# Patient Record
Sex: FEMALE | HISPANIC OR LATINO | Employment: STUDENT | URBAN - METROPOLITAN AREA
[De-identification: names, ages, dates, MRNs, and addresses within clinical notes are randomized per-mention and may not be internally consistent; named-entity substitution may affect disease eponyms.]

---
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
## 2025-02-06 ENCOUNTER — OFFICE VISIT (OUTPATIENT)
Dept: FAMILY MEDICINE | Facility: CLINIC | Age: 20
End: 2025-02-06
Payer: COMMERCIAL

## 2025-02-06 VITALS
DIASTOLIC BLOOD PRESSURE: 81 MMHG | BODY MASS INDEX: 30.49 KG/M2 | OXYGEN SATURATION: 98 % | WEIGHT: 183 LBS | TEMPERATURE: 98.5 F | RESPIRATION RATE: 18 BRPM | SYSTOLIC BLOOD PRESSURE: 124 MMHG | HEIGHT: 65 IN | HEART RATE: 95 BPM

## 2025-02-06 DIAGNOSIS — Z11.3 SCREENING FOR STDS (SEXUALLY TRANSMITTED DISEASES): ICD-10-CM

## 2025-02-06 DIAGNOSIS — N39.0 FREQUENT UTI: Primary | ICD-10-CM

## 2025-02-06 LAB
ALBUMIN UR-MCNC: NEGATIVE MG/DL
APPEARANCE UR: ABNORMAL
BACTERIA #/AREA URNS HPF: ABNORMAL /HPF
BILIRUB UR QL STRIP: NEGATIVE
CLUE CELLS: ABNORMAL
COLOR UR AUTO: YELLOW
GLUCOSE UR STRIP-MCNC: NEGATIVE MG/DL
HGB UR QL STRIP: NEGATIVE
KETONES UR STRIP-MCNC: NEGATIVE MG/DL
LEUKOCYTE ESTERASE UR QL STRIP: NEGATIVE
NITRATE UR QL: NEGATIVE
PH UR STRIP: 6 [PH] (ref 5–7)
RBC #/AREA URNS AUTO: ABNORMAL /HPF
SP GR UR STRIP: 1.02 (ref 1–1.03)
SQUAMOUS #/AREA URNS AUTO: ABNORMAL /LPF
TRICHOMONAS, WET PREP: ABNORMAL
UROBILINOGEN UR STRIP-ACNC: 0.2 E.U./DL
WBC #/AREA URNS AUTO: ABNORMAL /HPF
WBC'S/HIGH POWER FIELD, WET PREP: ABNORMAL
YEAST, WET PREP: ABNORMAL

## 2025-02-06 ASSESSMENT — PAIN SCALES - GENERAL: PAINLEVEL_OUTOF10: MODERATE PAIN (4)

## 2025-02-06 NOTE — PROGRESS NOTES
"  Assessment & Plan     Frequent UTI - no UTI based on criteria today. She is still experiencing urinary frequency, odor, and dysuria however. Negative wet prep. Will await STI results. She reports she is her partner's first sexual partner however. Advised she hydrate well, urinate often, and start a urinary probiotic.   - UA with Microscopic reflex to Culture - Clinic Collect  - UA Microscopic with Reflex to Culture  - UA Macroscopic with reflex to Microscopic and Culture - Lab Collect; Future  - Wet prep - lab collect; Future  - Wet prep - lab collect    Screening for STDs (sexually transmitted diseases)  - Chlamydia trachomatis/Neisseria gonorrhoeae by PCR- VAGINAL SELF-SWAB          BMI  Estimated body mass index is 30.22 kg/m  as calculated from the following:    Height as of this encounter: 1.657 m (5' 5.25\").    Weight as of this encounter: 83 kg (183 lb).       Niles Hauser is a 19 year old, presenting for the following health issues:  UTI (Constant UTI's, keep reoccurring once she get off of the prescribed antibiotics and pain worsens with each infection. Urgent Care told her to schedule an appointment with the frequency it happens.)        2/6/2025     3:08 PM   Additional Questions   Accompanied by Astrid Bentley     UTI    History of Present Illness       Reason for visit:  Constant urgent care visits for reoccurring uti and pain getting worse  Symptom onset:  More than a month  Symptoms include:  Odor of urine, discoloration, pain  Symptom intensity:  Moderate  Symptom progression:  Worsening  Had these symptoms before:  No  What makes it worse:  No  What makes it better:  No She is missing 1 dose(s) of medications per week.  She is not taking prescribed medications regularly due to remembering to take.       ED/UC Followup:    Facility:  [facility]  Date of visit: 01/13/2025  Reason for visit: UTI  Current Status: Same - Not Improving    Genitourinary - Female  Onset/Duration: Off and On Since " "02/2024  Description:   Painful urination (Dysuria): YES           Frequency: YES  Blood in urine (Hematuria): No  Delay in urine (Hesitency): YES  Intensity: moderate  Progression of Symptoms:  worsening, same, and more frequent  Accompanying Signs & Symptoms:  Fever/chills: No  Flank pain: YES- slight  Nausea and vomiting: YES  Vaginal symptoms: odor  Abdominal/Pelvic Pain: YES  History:   History of frequent UTI s: YES  History of kidney stones: No  Sexually Active: YES  Possibility of pregnancy: No  Precipitating or alleviating factors: None  Therapies tried and outcome: Increase fluid intake        Review of Systems  Constitutional, HEENT, cardiovascular, pulmonary, gi and gu systems are negative, except as otherwise noted.      Objective    /81 (BP Location: Right arm, Patient Position: Sitting, Cuff Size: Adult Large)   Pulse 95   Temp 98.5  F (36.9  C) (Oral)   Resp 18   Ht 1.657 m (5' 5.25\")   Wt 83 kg (183 lb)   LMP 01/23/2025 (Exact Date)   SpO2 98%   BMI 30.22 kg/m    Body mass index is 30.22 kg/m .  Physical Exam   GENERAL: alert and no distress    Results for orders placed or performed in visit on 02/06/25   UA with Microscopic reflex to Culture - Clinic Collect     Status: Abnormal    Specimen: Urine, Clean Catch   Result Value Ref Range    Color Urine Yellow Colorless, Straw, Light Yellow, Yellow    Appearance Urine Cloudy (A) Clear    Glucose Urine Negative Negative mg/dL    Bilirubin Urine Negative Negative    Ketones Urine Negative Negative mg/dL    Specific Gravity Urine 1.025 1.003 - 1.035    Blood Urine Negative Negative    pH Urine 6.0 5.0 - 7.0    Protein Albumin Urine Negative Negative mg/dL    Urobilinogen Urine 0.2 0.2, 1.0 E.U./dL    Nitrite Urine Negative Negative    Leukocyte Esterase Urine Negative Negative   UA Microscopic with Reflex to Culture     Status: Abnormal   Result Value Ref Range    Bacteria Urine Many (A) None Seen /HPF    RBC Urine 0-2 0-2 /HPF /HPF    WBC " Urine 5-10 (A) 0-5 /HPF /HPF    Squamous Epithelials Urine Few (A) None Seen /LPF    Narrative    Urine Culture not indicated   Wet prep - lab collect     Status: Abnormal    Specimen: Vagina; Swab   Result Value Ref Range    Trichomonas Absent Absent    Yeast Absent Absent    Clue Cells Absent Absent    WBCs/high power field 3+ (A) None           Signed Electronically by: Shawna Smith MD

## 2025-02-10 DIAGNOSIS — R30.0 DYSURIA: Primary | ICD-10-CM

## 2025-05-07 ENCOUNTER — OFFICE VISIT (OUTPATIENT)
Dept: UROLOGY | Facility: CLINIC | Age: 20
End: 2025-05-07
Attending: UROLOGY
Payer: COMMERCIAL

## 2025-05-07 VITALS
WEIGHT: 186.6 LBS | DIASTOLIC BLOOD PRESSURE: 76 MMHG | HEIGHT: 65 IN | SYSTOLIC BLOOD PRESSURE: 113 MMHG | HEART RATE: 94 BPM | BODY MASS INDEX: 31.09 KG/M2

## 2025-05-07 DIAGNOSIS — R30.0 DYSURIA: ICD-10-CM

## 2025-05-07 DIAGNOSIS — N39.0 RECURRENT UTI: Primary | ICD-10-CM

## 2025-05-07 LAB
ALBUMIN UR-MCNC: 10 MG/DL
ALBUMIN UR-MCNC: NEGATIVE MG/DL
APPEARANCE UR: ABNORMAL
APPEARANCE UR: ABNORMAL
BACTERIA #/AREA URNS HPF: ABNORMAL /HPF
BILIRUB UR QL STRIP: NEGATIVE
BILIRUB UR QL STRIP: NEGATIVE
COLOR UR AUTO: YELLOW
COLOR UR AUTO: YELLOW
GLUCOSE UR STRIP-MCNC: NEGATIVE MG/DL
GLUCOSE UR STRIP-MCNC: NEGATIVE MG/DL
HCG UR QL: NEGATIVE
HGB UR QL STRIP: ABNORMAL
HGB UR QL STRIP: NEGATIVE
INTERNAL QC OK POCT: NORMAL
KETONES UR STRIP-MCNC: NEGATIVE MG/DL
KETONES UR STRIP-MCNC: NEGATIVE MG/DL
LEUKOCYTE ESTERASE UR QL STRIP: ABNORMAL
LEUKOCYTE ESTERASE UR QL STRIP: ABNORMAL
MUCOUS THREADS #/AREA URNS LPF: PRESENT /LPF
NITRATE UR QL: NEGATIVE
NITRATE UR QL: NEGATIVE
PH UR STRIP: 6 [PH] (ref 5–7)
PH UR STRIP: 6 [PH] (ref 5–8)
POCT KIT EXPIRATION DATE: NORMAL
POCT KIT LOT NUMBER: NORMAL
RBC URINE: 2 /HPF
SP GR UR STRIP: 1.03 (ref 1–1.03)
SP GR UR STRIP: >=1.03 (ref 1–1.03)
SQUAMOUS EPITHELIAL: 1 /HPF
UROBILINOGEN UR STRIP-ACNC: 0.2 E.U./DL
UROBILINOGEN UR STRIP-MCNC: NORMAL MG/DL
WBC URINE: 59 /HPF

## 2025-05-07 PROCEDURE — 3078F DIAST BP <80 MM HG: CPT | Performed by: UROLOGY

## 2025-05-07 PROCEDURE — 81025 URINE PREGNANCY TEST: CPT | Performed by: UROLOGY

## 2025-05-07 PROCEDURE — 81001 URINALYSIS AUTO W/SCOPE: CPT | Performed by: UROLOGY

## 2025-05-07 PROCEDURE — 99213 OFFICE O/P EST LOW 20 MIN: CPT | Performed by: UROLOGY

## 2025-05-07 PROCEDURE — 87186 SC STD MICRODIL/AGAR DIL: CPT | Performed by: UROLOGY

## 2025-05-07 PROCEDURE — 99204 OFFICE O/P NEW MOD 45 MIN: CPT | Performed by: UROLOGY

## 2025-05-07 PROCEDURE — 3074F SYST BP LT 130 MM HG: CPT | Performed by: UROLOGY

## 2025-05-07 PROCEDURE — 81003 URINALYSIS AUTO W/O SCOPE: CPT | Performed by: UROLOGY

## 2025-05-07 NOTE — LETTER
"5/7/2025       RE: Arti Luna  [address]  [facility] [address]     Dear Colleague,    Thank you for referring your patient, Arti Luna, to the [facility] [place] at [facility]. Please see a copy of my visit note below.    May 7, 2025    Referring Provider: Referred Self, MD  No address on file    Primary Care Provider: Shawna Smith    Assessment & Plan    Recurrent UTI    - CT Urogram wo & w Contrast; Future  - Routine UA with Micro Reflex to Culture; Future  - hCG qual urine POCT  - Routine UA with Micro Reflex to Culture  - Urine Culture    Dysuria    - UA Macroscopic with reflex to Microscopic and Culture - Lab Collect    At this time unclear etiology so we discussed evaluation and she wishes to proceed with CTU and cystoscopy    Given symptoms and the unremarkable urine dip will plan on sending formal urinalysis and urine culture    20 minutes were spent on this day of the encounter in reviewing the EMR including urinalysis and urine culture, direct patient care including ordering CTU, urinalysis and urine culture, coordination of care and documentation    Priya Betancourt MD MPH  (she/her/hers)   of Urology  [facility]      HPI:  Arti Luna is a 19 year old female who presents for evaluation of her pelvic floor symptoms.  She has been having urinary tract infections since January 2024.  Symptoms are dysuria, discoloration, odor, and can \"taste the infection in her mouth.\"    No febrile UTIs, no gross hematuria, constipation, no urinary incontinence.  No pelvic surgeries.      Is sexually active, denies dyspareunia.  Does not think that UTIs are related to intercourse.      Tries to drink about 4 L of water a day    Is adopted and does not know about her biological family.    Does not think she is pregnant, on OCPs, just had her menses.    Worried she might have a UTI " now.    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     Anemia, iron deficiency 1/2/2020     Anxiety      Dyslexia      Past Surgical History:   Procedure Laterality Date     COLONOSCOPY       CTRL NOSEBLEED,ANTER,SIMPLE  2009     ENDOSCOPY       GI SURGERY  6/14/2019 and 11/8/2019    Endoscope (6/14 and 11/8), Manometry (11/8/2019)     Social History     Socioeconomic History     Marital status: Single     Spouse name: Not on file     Number of children: Not on file     Years of education: Not on file     Highest education level: Not on file   Occupational History     Not on file   Tobacco Use     Smoking status: Never     Smokeless tobacco: Never   Vaping Use     Vaping status: Never Used   Substance and Sexual Activity     Alcohol use: Never     Drug use: Not Currently     Types: Solvent Inhalants     Comment: dry shampoo (aerosols removed from home)     Sexual activity: Yes     Partners: Male     Birth control/protection: Pill   Other Topics Concern     Not on file   Social History Narrative    Currently in 8th grade. Lives with parents. Does well in school. Adopted from [facility] [place] at 10 months of age.        7/23/2020: With her mother father and brother sister who is in college.  Should be in the ninth grade for the school year 6459-4351.  She enjoys volleyball, starting to box at home and using a treadmill.  She is interested in track.  She also has a fear of her last fall.  She has a bird at home that she has had for the last 7 years and she had a different birth prior to that.  She has no exposure to other unusual.  She had no travel outside [place] in the last year.     Social Drivers of Health     Financial Resource Strain: Low Risk  (10/1/2024)    Financial Resource Strain      Within the past 12 months, have you or your family members you live with been unable to get utilities (heat, electricity) when it was really needed?: No    Food Insecurity: Low Risk  (10/1/2024)    Food Insecurity      Within the past 12 months, did you worry that your food would run out before you got money to buy more?: No      Within the past 12 months, did the food you bought just not last and you didn t have money to get more?: No   Transportation Needs: Low Risk  (10/1/2024)    Transportation Needs      Within the past 12 months, has lack of transportation kept you from medical appointments, getting your medicines, non-medical meetings or appointments, work, or from getting things that you need?: No   Physical Activity: Insufficiently Active (10/1/2024)    Exercise Vital Sign      Days of Exercise per Week: 3 days      Minutes of Exercise per Session: 40 min   Stress: Stress Concern Present (10/1/2024)    [employer] - Occupational Stress Questionnaire      Feeling of Stress : To some extent   Social Connections: Unknown (10/1/2024)    Social Connection and Isolation Panel [NHANES]      Frequency of Communication with Friends and Family: Not on file      Frequency of Social Gatherings with Friends and Family: Twice a week      Attends Mormonism Services: Not on file      Active Member of Clubs or Organizations: Not on file      Attends Club or Organization Meetings: Not on file      Marital Status: Not on file   Interpersonal Safety: Low Risk  (10/1/2024)    Interpersonal Safety      Do you feel physically and emotionally safe where you currently live?: Yes      Within the past 12 months, have you been hit, slapped, kicked or otherwise physically hurt by someone?: No      Within the past 12 months, have you been humiliated or emotionally abused in other ways by your partner or ex-partner?: No   Housing Stability: Low Risk  (10/1/2024)    Housing Stability      Do you have housing? : Yes      Are you worried about losing your housing?: No     Family History   Adopted: Yes   Problem Relation Age of Onset     Unknown/Adopted Mother   "    Unknown/Adopted Father      ROS    Allergies   Allergen Reactions     Cats      Lactose GI Disturbance, Other (See Comments) and Nausea and Vomiting     Current Outpatient Medications   Medication Sig Dispense Refill     Ascorbic Acid (VITAMIN C PO)        Cholecalciferol (VITAMIN D) 125 MCG (5000 UT) CAPS Take 1 capsule (5,000 Units) by mouth daily       cloNIDine (CATAPRES) 0.1 MG tablet        famotidine (PEPCID) 40 MG tablet Take 1 tablet (40 mg) by mouth daily. 90 tablet 3     FLUoxetine (PROZAC) 40 MG capsule Take 60 mg by mouth daily       levonorgestrel-ethinyl estradiol (VIENVA) 0.1-20 MG-MCG tablet Take 1 tablet by mouth daily. 84 tablet 3     lisdexamfetamine (VYVANSE) 10 MG capsule Take 1 capsule (10 mg) by mouth every morning  0     Omega-3 Fatty Acids (OMEGA 3 PO)        Ferrous Sulfate (IRON PO)  (Patient not taking: Reported on 2/6/2025)       nitroFURantoin macrocrystal-monohydrate (MACROBID) 100 MG capsule Take 1 capsule (100 mg) by mouth every 12 hours for 5 days. 10 capsule 0     No current facility-administered medications for this visit.     /76   Pulse 94   Ht 1.657 m (5' 5.25\")   Wt 84.6 kg (186 lb 9.6 oz)   LMP 04/18/2025 (Exact Date)   BMI 30.81 kg/m    GENERAL: healthy, alert and no distress  EYES: Eyes grossly normal to inspection, conjunctivae and sclerae normal  HENT: normal cephalic/atraumatic.  External ears, nose and mouth without ulcers or lesions.  RESP: no audible wheeze, cough, or visible cyanosis.  No visible retractions or increased work of breathing.  Able to speak fully in complete sentences.  NEURO: Cranial nerves grossly intact, mentation intact and speech normal  PSYCH: mentation appears normal, affect normal/bright, judgement and insight intact, normal speech and appearance well-groomed    Urine dip trace blood and trace leuks    PVR 42 mL by bladder scan    Labs  CareEverywhere  2/23/25 Urinalysis 11-25 WBC, UCX > 100 K E coli  12/9/25 Urinalysis 3-5 WBC, " WBC clumps, UCX > 100 K E coli  10/24/24 Urinalysis 11-25 RBC, 11-25 WBC, UCX > 100 K E coli    CC  Patient Care Team:  Shawna Smith MD as PCP - General ([facility])  Shawna Smith MD as Assigned PCP  Manfred Oneal MD as MD (Pediatric Gastroenterology)  Matty Pena MD as Physician (Pediatric Cardiology)  Cezar Moreno MD as MD (Cardiovascular Disease)  Cezar Moreno MD as Assigned Heart and Vascular Provider  Priya Betancourt MD as MD (Urology)  SELF, REFERRED                Again, thank you for allowing me to participate in the care of your patient.      Sincerely,    Priya Betancourt MD

## 2025-05-07 NOTE — PATIENT INSTRUCTIONS
Stop baby wipes, wet wipes and using antibacterial soap    Websites with free information:    American Urogynecologic Society patient website: www.voicesforpfd.org    Total Control Program: www.totalcontrolprogram.com    Supplements to prevent UTI to consider  -Probiotics  -Cranberry (for these products let them know a doctor is recommending them)   [employer]na[employer]: https://[employer]/   Theracran HP by [employer] [facility] [address]  -d-mannose 2gm daily  -Vitamin C 500-1000mg twice a day    [facility] [place]  [address] 5th [address]  [facility]   [phone]  Park in the ramp attached to the hospital    It was a pleasure meeting with you today.  Thank you for allowing me and my team the privilege of caring for you today.  YOU are the reason we are here, and I truly hope we provided you with the excellent service you deserve.  Please let us know if there is anything else we can do for you so that we can be sure you are leaving completely satisfied with your care experience.    Cystoscopy    Cystoscopy is a procedure that lets your doctor look directly inside your urethra and bladder. It can be used to:  Help diagnose a problem with your urethra, bladder, or kidneys.  Take a sample (biopsy) of bladder or urethral tissue.  Treat certain problems (such as removing kidney stones).  Place a stent to bypass an obstruction.  Take special X-rays of the kidneys.  Based on the findings, your doctor may recommend other tests or treatments.  What is a cystoscope?  A cystoscope is a telescope-like instrument that contains lenses and fiberoptics (small glass wires that make bright light). The cystoscope may be straight and rigid, or flexible to bend around curves in the urethra. The doctor may look directly into the cystoscope, or project the image onto a monitor.  Getting ready  Ask your doctor if you should stop taking any medicines before the procedure.  Follow any other instructions your doctor gives you.  Tell your doctor  before the exam if you:  Take any medicines, such as aspirin or blood thinners  Have allergies to any medicines  Are pregnant   The procedure  Cystoscopy is done in the doctor s office, surgery center, or hospital. The doctor and a nurse are present during the procedure. It takes only a few minutes, longer if a biopsy, X-ray, or treatment needs to be done.  During the procedure:  You lie on an exam table on your back, knees bent and legs apart. You are covered with a drape.  Your urethra and the area around it are washed. Anesthetic jelly may be applied to numb the urethra.  The cystoscope is inserted. A sterile fluid is put into the bladder to expand it. You may feel pressure from this fluid.  When the procedure is done, the cystoscope is removed.  After the procedure   Once you re home:  Drink plenty of fluids.  You may have burning or light bleeding when you urinate--this is normal.  Medicines may be prescribed to ease any discomfort or prevent infection. Take these as directed.  Call your doctor if you have heavy bleeding or blood clots, burning that lasts more than a day, a fever over 100 F  (38  C), or trouble urinating.  Date Last Reviewed: 1/1/2017 2000-2017 The [employer]. [address] [place] [address], [place] [address]. All rights reserved. This information is not intended as a substitute for professional medical care. Always follow your healthcare professional's instructions.

## 2025-05-07 NOTE — PROGRESS NOTES
"May 7, 2025    Referring Provider: Referred Self, MD  No address on file    Primary Care Provider: Shawna Smith    {PROVIDER CHARTING PREFERENCE:[id number]}    *** minutes were spent on this day of the encounter in reviewing the EMR including ***, direct patient care, coordination of care and documentation    Priya Betancourt MD MPH  (she/her/hers)   of Urology  [facility]      HPI:  Arti Luna is a 19 year old female who presents for evaluation of her pelvic floor symptoms.  She has been having urinary tract infections since January 2024.  Symptoms are dysuria, discoloration, odor, and can \"taste the infection in her mouth.\"    No febrile UTIs, no gross hematuria, constipation, no urinary incontinence.  No pelvic surgeries.      Is sexually active, denies dyspareunia.  Does not think that UTIs are related to intercourse.      Tries to drink about 4 L of water a day    Is adopted and does not know about her biological family.    Does not think she is pregnant, on OCPs, just had her menses.    Past Medical History:   Diagnosis Date    Abnormal lower esophageal sphincter relaxation 1/2/2020    Adopted 2006    Anemia, iron deficiency 1/2/2020    Anxiety     Dyslexia      Past Surgical History:   Procedure Laterality Date    COLONOSCOPY      CTRL NOSEBLEED,ANTER,SIMPLE  2009    ENDOSCOPY      GI SURGERY  6/14/2019 and 11/8/2019    Endoscope (6/14 and 11/8), Manometry (11/8/2019)     Social History     Socioeconomic History    Marital status: Single     Spouse name: Not on file    Number of children: Not on file    Years of education: Not on file    Highest education level: Not on file   Occupational History    Not on file   Tobacco Use    Smoking status: Never    Smokeless tobacco: Never   Vaping Use    Vaping status: Never Used   Substance and Sexual Activity    Alcohol use: Never    Drug use: Not Currently     Types: Solvent Inhalants     Comment: dry shampoo (aerosols removed " from home)    Sexual activity: Yes     Partners: Male     Birth control/protection: Pill   Other Topics Concern    Not on file   Social History Narrative    Currently in 8th grade. Lives with parents. Does well in school. Adopted from [facility] at 10 months of age.        7/23/2020: With her mother father and brother sister who is in college.  Should be in the ninth grade for the school year 2061-9036.  She enjoys volleyball, starting to box at home and using a treadmill.  She is interested in track.  She also has a fear of her last fall.  She has a bird at home that she has had for the last 7 years and she had a different birth prior to that.  She has no exposure to other unusual.  She had no travel outside [facility] in the last year.     Social Drivers of Health     Financial Resource Strain: Low Risk  (10/1/2024)    Financial Resource Strain     Within the past 12 months, have you or your family members you live with been unable to get utilities (heat, electricity) when it was really needed?: No   Food Insecurity: Low Risk  (10/1/2024)    Food Insecurity     Within the past 12 months, did you worry that your food would run out before you got money to buy more?: No     Within the past 12 months, did the food you bought just not last and you didn t have money to get more?: No   Transportation Needs: Low Risk  (10/1/2024)    Transportation Needs     Within the past 12 months, has lack of transportation kept you from medical appointments, getting your medicines, non-medical meetings or appointments, work, or from getting things that you need?: No   Physical Activity: Insufficiently Active (10/1/2024)    Exercise Vital Sign     Days of Exercise per Week: 3 days     Minutes of Exercise per Session: 40 min   Stress: Stress Concern Present (10/1/2024)    [employer] - Occupational Stress Questionnaire     Feeling of Stress : To some extent   Social Connections: Unknown (10/1/2024)     Social Connection and Isolation Panel [NHANES]     Frequency of Communication with Friends and Family: Not on file     Frequency of Social Gatherings with Friends and Family: Twice a week     Attends Mormonism Services: Not on file     Active Member of Clubs or Organizations: Not on file     Attends Club or Organization Meetings: Not on file     Marital Status: Not on file   Interpersonal Safety: Low Risk  (10/1/2024)    Interpersonal Safety     Do you feel physically and emotionally safe where you currently live?: Yes     Within the past 12 months, have you been hit, slapped, kicked or otherwise physically hurt by someone?: No     Within the past 12 months, have you been humiliated or emotionally abused in other ways by your partner or ex-partner?: No   Housing Stability: Low Risk  (10/1/2024)    Housing Stability     Do you have housing? : Yes     Are you worried about losing your housing?: No     Family History   Adopted: Yes   Problem Relation Age of Onset    Unknown/Adopted Mother     Unknown/Adopted Father      ROS    Allergies   Allergen Reactions    Cats     Lactose GI Disturbance, Other (See Comments) and Nausea and Vomiting     Current Outpatient Medications   Medication Sig Dispense Refill    Ascorbic Acid (VITAMIN C PO)       Cholecalciferol (VITAMIN D) 125 MCG (5000 UT) CAPS Take 1 capsule (5,000 Units) by mouth daily      cloNIDine (CATAPRES) 0.1 MG tablet       famotidine (PEPCID) 40 MG tablet Take 1 tablet (40 mg) by mouth daily. 90 tablet 3    FLUoxetine (PROZAC) 40 MG capsule Take 60 mg by mouth daily      levonorgestrel-ethinyl estradiol (VIENVA) 0.1-20 MG-MCG tablet Take 1 tablet by mouth daily. 84 tablet 3    lisdexamfetamine (VYVANSE) 10 MG capsule Take 1 capsule (10 mg) by mouth every morning  0    Omega-3 Fatty Acids (OMEGA 3 PO)       Ferrous Sulfate (IRON PO)  (Patient not taking: Reported on 2/6/2025)       No current facility-administered medications for this visit.       There were no  vitals taken for this visit.  GENERAL: healthy, alert and no distress  EYES: Eyes grossly normal to inspection, conjunctivae and sclerae normal  HENT: normal cephalic/atraumatic.  External ears, nose and mouth without ulcers or lesions.  RESP: no audible wheeze, cough, or visible cyanosis.  No visible retractions or increased work of breathing.  Able to speak fully in complete sentences.  NEURO: Cranial nerves grossly intact, mentation intact and speech normal  PSYCH: mentation appears normal, affect normal/bright, judgement and insight intact, normal speech and appearance well-groomed    Urine dip ***    PVR *** mL by bladder scan    Labs  CareEverywhere  2/23/25 Urinalysis 11-25 WBC, UCX > 100 K E coli  12/9/25 Urinalysis 3-5 WBC, WBC clumps, UCX > 100 K E coli  10/24/24 Urinalysis 11-25 RBC, 11-25 WBC, UCX > 100 K E coli    CC  Patient Care Team:  Shawna Smith MD as PCP - General (Family Practice)  Shawna Simth MD as Assigned PCP  Manfred Oneal MD as MD (Pediatric Gastroenterology)  Matty Pena MD as Physician (Pediatric Cardiology)  Cezar Moreno MD as MD (Cardiovascular Disease)  Cezar Moreno MD as Assigned Heart and Vascular Provider  Priya Betancourt MD as MD (Urology)  SELF, REFERRED               MD Jonah as MD (Cardiovascular Disease)  Cezar Moreno MD as Assigned Heart and Vascular Provider  Priya Betancourt MD as MD (Urology)  SELF, REFERRED

## 2025-05-07 NOTE — NURSING NOTE
Chief Complaint   Patient presents with    New Patient     UTI     Patient voided about 70ml  PVR was about 42ml

## 2025-05-09 ENCOUNTER — RESULTS FOLLOW-UP (OUTPATIENT)
Dept: SURGERY | Facility: CLINIC | Age: 20
End: 2025-05-09

## 2025-05-09 LAB — BACTERIA UR CULT: ABNORMAL

## 2025-05-12 DIAGNOSIS — N39.0 RECURRENT UTI: Primary | ICD-10-CM

## 2025-05-12 RX ORDER — NITROFURANTOIN 25; 75 MG/1; MG/1
100 CAPSULE ORAL EVERY 12 HOURS
Qty: 10 CAPSULE | Refills: 0 | Status: SHIPPED | OUTPATIENT
Start: 2025-05-12 | End: 2025-05-17

## 2025-06-06 ENCOUNTER — HOSPITAL ENCOUNTER (OUTPATIENT)
Dept: CT IMAGING | Facility: CLINIC | Age: 20
Discharge: HOME | End: 2025-06-06
Attending: UROLOGY | Admitting: UROLOGY
Payer: COMMERCIAL

## 2025-06-06 DIAGNOSIS — N39.0 RECURRENT UTI: ICD-10-CM

## 2025-06-06 PROCEDURE — 250N000011 HC RX IP 250 OP 636: Performed by: UROLOGY

## 2025-06-06 PROCEDURE — 74178 CT ABD&PLV WO CNTR FLWD CNTR: CPT

## 2025-06-06 PROCEDURE — 250N000009 HC RX 250: Performed by: UROLOGY

## 2025-06-06 RX ORDER — IOPAMIDOL 755 MG/ML
500 INJECTION, SOLUTION INTRAVASCULAR ONCE
Status: COMPLETED | OUTPATIENT
Start: 2025-06-06 | End: 2025-06-06

## 2025-06-06 RX ADMIN — IOPAMIDOL 120 ML: 755 INJECTION, SOLUTION INTRAVENOUS at 14:28

## 2025-06-06 RX ADMIN — SODIUM CHLORIDE 95 ML: 9 INJECTION, SOLUTION INTRAVENOUS at 14:28

## 2025-06-10 NOTE — RESULT ENCOUNTER NOTE
This message sent to patient: your CT did not show any obvious reason for the infections. Dr Betancourt recommends, at minimum, follow up in a few months with [facility] if not having any more symptoms/infections. You can see LATOSHA Joe, in [place] or LATOHSA Hopkins in [place] or [place] location. Please call [phone] to make that follow up appointment.       Thank you,  Mary Ann Mazariegos RN, BSN   Urology Triage Nurse

## 2025-09-01 ENCOUNTER — PATIENT OUTREACH (OUTPATIENT)
Dept: CARE COORDINATION | Facility: CLINIC | Age: 20
End: 2025-09-01
Payer: COMMERCIAL